# Patient Record
Sex: FEMALE | Race: WHITE | Employment: FULL TIME | URBAN - METROPOLITAN AREA
[De-identification: names, ages, dates, MRNs, and addresses within clinical notes are randomized per-mention and may not be internally consistent; named-entity substitution may affect disease eponyms.]

---

## 2019-02-05 ENCOUNTER — HOSPITAL ENCOUNTER (EMERGENCY)
Age: 34
Discharge: HOME | End: 2019-02-05
Attending: EMERGENCY MEDICINE
Payer: COMMERCIAL

## 2019-02-05 ENCOUNTER — APPOINTMENT (OUTPATIENT)
Dept: GENERAL RADIOLOGY | Age: 34
End: 2019-02-05
Payer: COMMERCIAL

## 2019-02-05 VITALS
BODY MASS INDEX: 32.44 KG/M2 | TEMPERATURE: 98.6 F | RESPIRATION RATE: 18 BRPM | DIASTOLIC BLOOD PRESSURE: 82 MMHG | SYSTOLIC BLOOD PRESSURE: 131 MMHG | HEART RATE: 90 BPM | WEIGHT: 190 LBS | OXYGEN SATURATION: 100 % | HEIGHT: 64 IN

## 2019-02-05 DIAGNOSIS — J06.9 VIRAL URI WITH COUGH: Primary | ICD-10-CM

## 2019-02-05 LAB
DIRECT EXAM: NORMAL
Lab: NORMAL
SPECIMEN DESCRIPTION: NORMAL
STATUS: NORMAL

## 2019-02-05 PROCEDURE — 71046 X-RAY EXAM CHEST 2 VIEWS: CPT

## 2019-02-05 PROCEDURE — 87804 INFLUENZA ASSAY W/OPTIC: CPT

## 2019-02-05 PROCEDURE — 99283 EMERGENCY DEPT VISIT LOW MDM: CPT

## 2019-02-05 RX ORDER — CIPROFLOXACIN 500 MG/1
500 TABLET, FILM COATED ORAL 2 TIMES DAILY
Qty: 20 TABLET | Refills: 0 | Status: SHIPPED | OUTPATIENT
Start: 2019-02-05 | End: 2019-02-15

## 2019-02-05 RX ORDER — ALBUTEROL SULFATE 90 UG/1
2 AEROSOL, METERED RESPIRATORY (INHALATION) EVERY 6 HOURS PRN
COMMUNITY

## 2019-02-05 RX ORDER — GUAIFENESIN AND CODEINE PHOSPHATE 100; 10 MG/5ML; MG/5ML
7.5 SOLUTION ORAL 3 TIMES DAILY PRN
Qty: 100 ML | Refills: 0 | Status: SHIPPED | OUTPATIENT
Start: 2019-02-05 | End: 2019-02-08

## 2019-02-05 RX ORDER — PREDNISONE 10 MG/1
40 TABLET ORAL DAILY
Qty: 20 TABLET | Refills: 0 | Status: SHIPPED | OUTPATIENT
Start: 2019-02-05 | End: 2019-02-10

## 2019-02-05 ASSESSMENT — ENCOUNTER SYMPTOMS
TROUBLE SWALLOWING: 0
EYE REDNESS: 0
ABDOMINAL PAIN: 0
VOMITING: 0
NAUSEA: 0
COUGH: 1
DIARRHEA: 0
SHORTNESS OF BREATH: 1
EYE DISCHARGE: 0

## 2021-11-04 ENCOUNTER — HOSPITAL ENCOUNTER (OUTPATIENT)
Age: 36
Setting detail: SPECIMEN
Discharge: HOME | End: 2021-11-04
Payer: COMMERCIAL

## 2021-11-06 LAB
SARS-COV-2: NORMAL
SARS-COV-2: NOT DETECTED
SOURCE: NORMAL

## 2022-10-12 ENCOUNTER — HOSPITAL ENCOUNTER (OUTPATIENT)
Age: 37
Discharge: HOME | End: 2022-10-12

## 2022-10-12 ENCOUNTER — HOSPITAL ENCOUNTER (OUTPATIENT)
Dept: GENERAL RADIOLOGY | Age: 37
Discharge: HOME | End: 2022-10-14
Payer: COMMERCIAL

## 2022-10-12 DIAGNOSIS — T14.90XA INJURY: ICD-10-CM

## 2022-10-12 PROCEDURE — 73130 X-RAY EXAM OF HAND: CPT

## 2022-10-14 ENCOUNTER — OFFICE VISIT (OUTPATIENT)
Dept: ORTHOPEDIC SURGERY | Age: 37
End: 2022-10-14
Payer: COMMERCIAL

## 2022-10-14 VITALS — BODY MASS INDEX: 32.44 KG/M2 | WEIGHT: 190 LBS | HEIGHT: 64 IN

## 2022-10-14 DIAGNOSIS — S62.514A CLOSED NONDISPLACED FRACTURE OF PROXIMAL PHALANX OF RIGHT THUMB, INITIAL ENCOUNTER: Primary | ICD-10-CM

## 2022-10-14 DIAGNOSIS — S63.91XA SPRAIN OF RIGHT HAND, INITIAL ENCOUNTER: ICD-10-CM

## 2022-10-14 DIAGNOSIS — S63.91XA SPRAIN OF UNSPECIFIED PART OF RIGHT WRIST AND HAND, INITIAL ENCOUNTER: ICD-10-CM

## 2022-10-14 PROCEDURE — 99203 OFFICE O/P NEW LOW 30 MIN: CPT | Performed by: PHYSICIAN ASSISTANT

## 2022-10-14 RX ORDER — METHYLPREDNISOLONE 4 MG/1
TABLET ORAL
Qty: 1 KIT | Refills: 0 | Status: SHIPPED | OUTPATIENT
Start: 2022-10-14 | End: 2022-10-20

## 2022-10-14 RX ORDER — MONTELUKAST SODIUM 10 MG/1
TABLET ORAL
COMMUNITY
Start: 2022-09-11

## 2022-10-14 NOTE — PROGRESS NOTES
[address]  [address] [facility] [address]  Dept: [phone]    Ambulatory Orthopedic New Patient Visit      CHIEF COMPLAINT:    Chief Complaint   Patient presents with    New Patient     [facility] DOI-10/12 RIGHT THUMB AND WRIST       HISTORY OF PRESENT ILLNESS:      Date of injury: 10/12/2022  [facility] Claim #: ?    Approved Dx:   1. Q29.333Z Non-displaced fracture proximal phalanx of right thumb   2. S63.91XA Sprain of unspecified part of right wrist and hand      Ashley Harvey is a 40 y.o. female who presents to the office today with right wrist and thumb pain after a work-related injury sustained on 10/12/2022. 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 The patient noted immediate pain within the right wrist and thumb. She was evaluated by occupational health on 10/13/2022 and was found to have a probable avulsion fracture of the distal phalanx of the right thumb. She was given a thumb spica brace and was instructed to follow-up in our office today for further evaluation and treatment. Patient notes improvement in her right hand discomfort since the initial injury but does have discomfort within the thumb and into the radial side of the right wrist.  He denies pain within the right wrist or hand prior to the injury on 10/12/2022. She denies prior surgery to the right wrist or hand prior to the injury. Past Medical History:    History reviewed. No pertinent past medical history. Past Surgical History:    History reviewed. No pertinent surgical history.     Current Medications:   Current Outpatient Medications   Medication Sig Dispense Refill    montelukast (SINGULAIR) 10 MG tablet TAKE 1 TABLET BY MOUTH IN THE EVENING      albuterol sulfate  (90 Base) MCG/ACT inhaler Inhale 2 puffs into the lungs every 6 hours as needed for Wheezing      methylPREDNISolone (MEDROL DOSEPACK) 4 MG tablet Take by mouth. 1 kit 0     No current facility-administered medications for this visit. Allergies:    Sulfa antibiotics    Social History:   Social History     Socioeconomic History    Marital status:      Spouse name: Not on file    Number of children: Not on file    Years of education: Not on file    Highest education level: Not on file   Occupational History    Not on file   Tobacco Use    Smoking status: Never    Smokeless tobacco: Never   Vaping Use    Vaping Use: Never used   Substance and Sexual Activity    Alcohol use: Yes     Comment: socailly    Drug use: No    Sexual activity: Yes     Partners: Male   Other Topics Concern    Not on file   Social History Narrative    Not on file     Social Determinants of Health     Financial Resource Strain: Not on file   Food Insecurity: Not on file   Transportation Needs: Not on file   Physical Activity: Not on file   Stress: Not on file   Social Connections: Not on file   Intimate Partner Violence: Not on file   Housing Stability: Not on file       Family History:  History reviewed. No pertinent family history. REVIEW OF SYSTEMS:  Review of Systems   Constitutional:  Negative for activity change and fever. HENT:  Negative for sneezing. Respiratory:  Negative for cough and shortness of breath. Cardiovascular:  Negative for chest pain. Gastrointestinal:  Negative for vomiting. Musculoskeletal:  Positive for arthralgias (right wrist, thumb). Negative for joint swelling and myalgias. Skin:  Negative for color change. Neurological:  Negative for weakness and numbness. Psychiatric/Behavioral:  Negative for sleep disturbance. PHYSICAL EXAM:  Ht 5' 4\" (1.626 m)   Wt 190 lb (86.2 kg)   BMI 32.61 kg/m²  Body mass index is 32.61 kg/m². Physical Exam  Gen: alert and oriented  Psych:  Appropriate affect;  Appropriate knowledge base; Appropriate mood; No hallucinations; Head: normocephalic, atraumatic   Chest: symmetric chest excursion  Pelvis: stable with ambulation  Ortho Exam  Extremity:  Patient arrived in a thumb spica brace on the right upper extremity. After removal of the brace evaluation of the right wrist and hand reveals mild diffuse swelling noted on the dorsum of the right hand and into the base of the right thumb. There is no erythema, ecchymosis, skin lesions or signs of infection noted. The patient is able to make a loose composite fist with the right hand without difficulty. She notes discomfort with flexion and extension of the right wrist primarily on the radial side. No tenderness with palpation noted over the distal radius or ulna. Tenderness noted over the IP joint of the right thumb primarily on the palmar surface. No ligamentous laxity appreciated with valgus or varus force placed on the IP joint of the right thumb. The patient is able to actively flex and extend the IP joint of the right thumb and the MCP joint of the right thumb without difficulty. Sensation is intact to light touch of the right upper extremity without focal deficits present. The skin is noted to be warm with brisk capillary fill distally on the right hand. Radiology:  XR HAND RIGHT (MIN 3 VIEWS)    Result Date: 10/12/2022  EXAM: XR Right Hand Complete, 3 or More Views EXAM DATE/TIME: 10/12/2022 3:26 pm CLINICAL HISTORY: ORDERING SYSTEM PROVIDED  Injury  TECHNOLOGIST PROVIDED HISTORY: hyperextended thumb  SYSTOC ORDER EK:->[id number] TECHNIQUE: Frontal, lateral and oblique views of the right hand. COMPARISON: No relevant prior studies available. FINDINGS: Bones/joints:  Horizontal lucency paralleling the articular surface involving the base of the proximal phalanx of the right thumb suggesting a nondisplaced fracture. No dislocation. Soft tissues:  No acute findings. No radiopaque foreign body.      Horizontal lucency paralleling the articular surface involving the base of the proximal phalanx of the right thumb suggesting a nondisplaced fracture. ASSESSMENT:     1. Closed nondisplaced fracture of proximal phalanx of right thumb, initial encounter    2. Sprain of right hand, initial encounter         PLAN:     Patient is here today for a right hand/thumb injury sustained on 10/12/2022 while at work with [employer]. Patient was seen and evaluated on 10/13/2022 by [facility] occupational health and was found to have a nondisplaced fracture of the proximal phalanx of the right thumb with a sprain of the right hand. Due to the patient's continued pain within the right hand work restrictions including:  No lifting, reaching, pushing, pulling with the right upper extremity were placed. Patient can continue to work on the computer at work but needs to [employer] in the right wrist thumb spica brace. Patient was given a prescription for a Medrol Dosepak for her inflammation and discomfort within the right hand. She was instructed to transition to Tylenol and/or ibuprofen after completion of the Medrol Dosepak. The patient may remove the brace multiple times per day to work on gentle range of motion of the right wrist and hand so that it does not get overly stiff. She is to follow-up in 10 days for reevaluation. No x-ray needed at next appointment unless the patient is more painful. Return in about 10 days (around 10/24/2022) for re-evaluation. Total Time: 35 min      No orders of the defined types were placed in this encounter. No orders of the defined types were placed in this encounter. This note is created with the assistance of a speech recognition program.  While intending to generate a document that actually reflects the content of the visit, the document can still have some errors including those of syntax and sound a like substitutions which may escape proof reading.   In such instances, actual meaning can be extrapolated by contextual diversion.      Electronically signed by Deacon Colon PA-C 10/17/2022 at 11:11 AM

## 2022-10-17 ASSESSMENT — ENCOUNTER SYMPTOMS
SHORTNESS OF BREATH: 0
COLOR CHANGE: 0
COUGH: 0
VOMITING: 0

## 2022-10-24 ENCOUNTER — OFFICE VISIT (OUTPATIENT)
Dept: ORTHOPEDIC SURGERY | Age: 37
End: 2022-10-24
Payer: COMMERCIAL

## 2022-10-24 VITALS — HEIGHT: 64 IN | WEIGHT: 190 LBS | BODY MASS INDEX: 32.44 KG/M2

## 2022-10-24 DIAGNOSIS — S63.91XD SPRAIN OF RIGHT HAND, SUBSEQUENT ENCOUNTER: Primary | ICD-10-CM

## 2022-10-24 PROCEDURE — 99214 OFFICE O/P EST MOD 30 MIN: CPT | Performed by: PHYSICIAN ASSISTANT

## 2022-10-24 ASSESSMENT — ENCOUNTER SYMPTOMS
COLOR CHANGE: 0
COUGH: 0
VOMITING: 0
SHORTNESS OF BREATH: 0

## 2022-10-24 NOTE — PROGRESS NOTES
[address]  [address] [facility] [address]  Dept: [phone]  Dept Fax: [phone]        Ambulatory Follow Up [facility]      Subjective:   Luz Marina Kidd is a 40y.o. year old female who presents to our office today for routine followup regarding her   1. Sprain of right hand, subsequent encounter        Chief Complaint   Patient presents with    Follow-up     [facility] R thumb and Wrist injury on 10/12/2022        Date of injury: 10/12/2022  [facility] Claim #: ?     Approved Dx:   1. B22.775C Non-displaced fracture proximal phalanx of right thumb   2. S63.91XA Sprain of unspecified part of right wrist and hand       HPI Luz Marina Kidd  is a 40 y.o. Right hand dominant  female who presents today in follow for right hand injury that was sustained on 10/12/2022 while working for [employer]. The patient was last seen on 10/14/2022 and underwent treatment in the form of work restrictions stating no lifting, reaching, pushing, pulling with the right upper extremity but patient may continue seated computer work, remain in thumb spica brace on the right upper extremity, prescription for a Medrol Dosepak. The patient notes some improvement with the previous treatment. She notes that yesterday she utilized her hand without the brace on for a couple hours and noticed an increase in her discomfort primarily within the thumb. Review of Systems   Constitutional:  Negative for activity change and fever. HENT:  Negative for sneezing. Respiratory:  Negative for cough and shortness of breath. Cardiovascular:  Negative for chest pain. Gastrointestinal:  Negative for vomiting. Musculoskeletal:  Positive for arthralgias (Right hand). Negative for joint swelling and myalgias. Skin:  Negative for color change. Neurological:  Negative for weakness and numbness. Psychiatric/Behavioral:  Negative for sleep disturbance. Objective :   Ht 5' 4\" (1.626 m)   Wt 190 lb (86.2 kg)   BMI 32.61 kg/m²  Body mass index is 32.61 kg/m². General: Melina Daily is a 40 y.o. female who is alert and oriented and sitting comfortably in our office. Ortho Exam  MS: Patient arrived in a thumb spica brace on the right upper extremity. After removal of the brace evaluation of the right wrist and hand reveals no obvious deformity. There is no erythema, ecchymosis, edema, skin lesions or signs of infection noted. The patient is able to make a tight composite fist with the right hand but does note discomfort in through the first metacarpal and into the radial side of the wrist.  Mild tenderness noted with palpation over the MCP joint of the right thumb. No tenderness over the IP joint. Remainder of the right hand is nontender with palpation. Sensation is intact to light touch of the right upper extremity without focal deficits present. The skin is noted to be warm with brisk capillary refill distally on the right hand. Neuro: alert and oriented to person and place. Eyes: Extra-ocular muscles intact  Mouth: Oral mucosa moist. No perioral lesions  Pulm: Respirations unlabored and regular. Symmetric chest excursion without outward deformity is noted. Skin: warm, well perfused  Psych:   Patient has good fund of knowledge and displays understanging of exam, diagnosis, and plan. Radiology:   No new imaging obtained today in office. Assessment:      1. Sprain of right hand, subsequent encounter       Plan:      The patient is here for follow-up in regards to her right hand injury sustained on 10/12/2022 while at work. At this time elected the patient to continue light duty work restrictions stating: \"No lifting, reaching, pushing, pulling with the right upper extremity. Brace as needed on the right hand. The patient may continue computer work.   Please allow patient to complete occupational hand therapy and leave work if appointment times conflict. \"    We will request a referral to outpatient occupational hand therapy to work on right hand range of motion restoration, strengthening and improvement in her functional daily tasks. I discussed with the patient that she may wear her brace as needed on the right hand, but should continue to utilize it while at work. The patient will follow up in 4 weeks for reevaluation. She was instructed to call our office with any questions or concerns. She noted her understanding. Follow up:Return in about 4 weeks (around 11/21/2022) for re-evaluation. Total Time: 30 min      No orders of the defined types were placed in this encounter. No orders of the defined types were placed in this encounter. This note is created with the assistance of a speech recognition program.  While intending to generate a document that actually reflects the content of the visit, the document can still have some errors including those of syntax and sound a like substitutions which may escape proof reading. In such instances, actual meaning can be extrapolated by contextual diversion.      Electronically signed by Aamir Chappell PA-C on 10/24/2022 at 12:54 PM

## 2022-11-28 ENCOUNTER — OFFICE VISIT (OUTPATIENT)
Dept: ORTHOPEDIC SURGERY | Age: 37
End: 2022-11-28
Payer: COMMERCIAL

## 2022-11-28 VITALS — WEIGHT: 190 LBS | HEIGHT: 64 IN | BODY MASS INDEX: 32.44 KG/M2

## 2022-11-28 DIAGNOSIS — S63.91XD SPRAIN OF RIGHT HAND, SUBSEQUENT ENCOUNTER: Primary | ICD-10-CM

## 2022-11-28 PROCEDURE — 99214 OFFICE O/P EST MOD 30 MIN: CPT | Performed by: PHYSICIAN ASSISTANT

## 2022-11-28 ASSESSMENT — ENCOUNTER SYMPTOMS
COUGH: 0
VOMITING: 0
SHORTNESS OF BREATH: 0
COLOR CHANGE: 0

## 2022-11-28 NOTE — PROGRESS NOTES
[address]  [address] [facility] [address]  Dept: [phone]  Dept Fax: [phone]        [facility] [address] [facility]      Subjective:   Italo Villatoro is a 40y.o. year old female who presents to our office today for routine followup regarding her   1. Sprain of right hand, subsequent encounter    . Chief Complaint   Patient presents with    Follow-up     [facility]   PT was not approved nor denied so patient has not started. Date of injury: 10/12/2022  [address] [place] [place] Claim #: [id number]     Approved Dx:   1. R51.105S Non-displaced fracture proximal phalanx of right thumb   2. S63.91XA Sprain of unspecified part of right wrist and hand       HPI Italo Villatoro  is a 40 y.o. Right hand dominant  female who presents today in follow for right hand injury sustained on 10/12/2022 while working with [employer]. Patient was last seen on 10/24/2022 and was instructed to continue her light duty restrictions stating \"no lifting, reaching, pushing, pulling with the right upper extremity but patient may continue seated computer work\" she notes that she has been wearing her right wrist brace intermittently but has not been relying on it full-time. Patient does note significant improvement in her right hand pain but notes limitations in her  strength since the injury. At her last appointment we did submit a C9 to request occupational hand therapy, we have not received approval or denial therefore it was recently resubmitted. Review of Systems   Constitutional:  Negative for activity change and fever. HENT:  Negative for sneezing. Respiratory:  Negative for cough and shortness of breath. Cardiovascular:  Negative for chest pain. Gastrointestinal:  Negative for vomiting. Musculoskeletal:  Negative for arthralgias, joint swelling and myalgias. Skin:  Negative for color change.    Neurological:  Negative for weakness and numbness. Psychiatric/Behavioral:  Negative for sleep disturbance. Objective :   Ht 5' 4\" (1.626 m)   Wt 190 lb (86.2 kg)   BMI 32.61 kg/m²  Body mass index is 32.61 kg/m². General: Georgia Bruner is a 40 y.o. female who is alert and oriented and sitting comfortably in our office. Ortho Exam  MS: Evaluation of the right wrist and hand reveals no obvious deformity. There is no erythema, ecchymosis, edema, skin lesions or signs of infection noted. The patient is able to make a tight composite fist with the right hand but does note mild discomfort in through the first metacarpal and into the radial side of the wrist.  Mild tenderness noted with palpation over the MCP joint of the right thumb. No tenderness over the IP joint. Remainder of the right hand is nontender with palpation. Sensation is intact to light touch of the right upper extremity without focal deficits present. The skin is noted to be warm with brisk capillary refill distally on the right hand. Radial pulse 2+ on the right. Neuro: alert and oriented to person and place. Eyes: Extra-ocular muscles intact  Mouth: Oral mucosa moist. No perioral lesions  Pulm: Respirations unlabored and regular. Symmetric chest excursion without outward deformity is noted. Skin: warm, well perfused  Psych:   Patient has good fund of knowledge and displays understanging of exam, diagnosis, and plan. Radiology:   No new imaging obtained today in office. Assessment:      1. Sprain of right hand, subsequent encounter       Plan:      Patient is here today for follow-up in regards to her right hand injury sustained at work on 10/12/2022 while working for [employer]. Patient was last evaluated on 10/24/2022 and underwent treatment in the form of submission for outpatient occupational hand therapy and to increase her right hand range of motion and  strength.   Patient notes that she did not receive any information about approval or denial for the physical therapy. She notes continued improvement in her pain but still notes discomfort within the  strength within her right hand. Our office recently resubmitted the C9 to request occupational hand therapy. I discussed with the patient that she should call her  to see if they can expedite the approval.      Patient will follow-up in 3 to 4 weeks for re-evaluation. She was instructed to continue her light duty work restrictions. Patient was instructed to only wear the brace intermittently. She noted her understanding. Follow up:Return in about 4 weeks (around 12/26/2022) for re-evaluation. Total Time: 30 min      No orders of the defined types were placed in this encounter. No orders of the defined types were placed in this encounter. This note is created with the assistance of a speech recognition program.  While intending to generate a document that actually reflects the content of the visit, the document can still have some errors including those of syntax and sound a like substitutions which may escape proof reading. In such instances, actual meaning can be extrapolated by contextual diversion.      Electronically signed by Anneliese Wong PA-C on 11/28/2022 at 10:55 AM

## 2022-12-29 ENCOUNTER — TELEPHONE (OUTPATIENT)
Dept: ORTHOPEDIC SURGERY | Age: 37
End: 2022-12-29

## 2022-12-29 NOTE — TELEPHONE ENCOUNTER
Martin Hunt from [address] [employer] called and wanted last OVN, [facility] and therapy notes faxed over.  Notes faxed over, informed her that we did not have approval for therapy, resent C9 for OT

## 2023-05-23 ENCOUNTER — HOSPITAL ENCOUNTER (EMERGENCY)
Age: 38
Discharge: HOME | End: 2023-05-23
Attending: EMERGENCY MEDICINE
Payer: COMMERCIAL

## 2023-05-23 VITALS
DIASTOLIC BLOOD PRESSURE: 76 MMHG | HEART RATE: 94 BPM | BODY MASS INDEX: 32.44 KG/M2 | SYSTOLIC BLOOD PRESSURE: 114 MMHG | WEIGHT: 190 LBS | RESPIRATION RATE: 18 BRPM | HEIGHT: 64 IN | OXYGEN SATURATION: 99 % | TEMPERATURE: 99.5 F

## 2023-05-23 DIAGNOSIS — R21 RASH AND OTHER NONSPECIFIC SKIN ERUPTION: Primary | ICD-10-CM

## 2023-05-23 PROCEDURE — 99282 EMERGENCY DEPT VISIT SF MDM: CPT

## 2023-05-23 RX ORDER — SPIRONOLACTONE 50 MG/1
TABLET, FILM COATED ORAL
COMMUNITY
Start: 2023-04-25 | End: 2023-05-23 | Stop reason: SINTOL

## 2023-05-23 RX ORDER — FAMOTIDINE 20 MG/1
20 TABLET, FILM COATED ORAL 2 TIMES DAILY
COMMUNITY

## 2023-05-23 ASSESSMENT — PAIN - FUNCTIONAL ASSESSMENT: PAIN_FUNCTIONAL_ASSESSMENT: 0-10

## 2023-05-23 ASSESSMENT — LIFESTYLE VARIABLES: HOW OFTEN DO YOU HAVE A DRINK CONTAINING ALCOHOL: 2-4 TIMES A MONTH

## 2023-05-23 NOTE — ED PROVIDER NOTES
[address] [facility] Emergency Department  [address] [facility] [address] [facility] [address]  Phone: [phone]  Fax: [phone]      Attending Physician [address]       Chief Complaint   Patient presents with    Rash       DIAGNOSTIC RESULTS     LABS:  Labs Reviewed - No data to display    All other labs were within normal range or not returned as of this dictation. RADIOLOGY:  No orders to display         EMERGENCY DEPARTMENT COURSE:   Vitals:    Vitals:    05/23/23 1659   BP: 114/76   Pulse: 94   Resp: 18   Temp: 99.5 °F (37.5 °C)   TempSrc: Oral   SpO2: 99%   Weight: 86.2 kg (190 lb)   Height: 5' 4\" (1.626 m)     -------------------------  BP: 114/76, Temp: 99.5 °F (37.5 °C), Pulse: 94, Respirations: 18             PERTINENT ATTENDING PHYSICIAN COMMENTS:    I performed a history and physical examination of the patient and discussed management with the mid level provider. I reviewed the mid level provider's note and agree with the documented findings and plan of care. Any areas of disagreement are noted on the chart. I was personally present for the key portions of any procedures. I have documented in the chart those procedures where I was not present during the key portions. I have reviewed the emergency nurses triage note. I agree with the chief complaint, past medical history, past surgical history, allergies, medications, social and family history as documented unless otherwise noted below. Documentation of the HPI, Physical Exam and Medical Decision Making performed by mid level providers is based on my personal performance of the HPI, PE and MDM. For Physician Assistant/ Nurse Practitioner cases/documentation I have personally evaluated this patient and have completed at least one if not all key elements of the E/M (history, physical exam, and MDM). Additional findings are as noted. Generalized, itchy rash. Recently started on spironolactone for PCOS.  No mucosal

## 2023-05-23 NOTE — ED TRIAGE NOTES
Pt to ED with c/o systemic upper body rash that started 5/12/23. Pt was started on Aldactone one month prior but had been using Benadryl and Singular. Pt was prescribed steroids by [facility] that she finished about 2-3 days ago with no relief. Rash is itchy and burning. Pt denies any change in soap/detergent/lotions.

## 2023-05-23 NOTE — ED PROVIDER NOTES
[address]  eMERGENCY dEPARTMENT eNCOUnter      Pt Name: Claudia Larios  MRN: [id number]  [place] 1985  Date of evaluation: 5/23/2023  Provider: Irais Borjas PA-C    CHIEF COMPLAINT       Chief Complaint   Patient presents with    Rash           HISTORY OF PRESENT ILLNESS  (Location/Symptom, Timing/Onset, Context/Setting, Quality, Duration, Modifying Factors, Severity.)   Claudia Larios is a 45 y.o. female who presents to the emergency department c/o rash to torso. States rash started 12 days ago. Patient started spironolactone 1 month prior to the rash, she saw urgent care who placed him on steroids and antihistamines which did not help. Denies any trouble breathing or swallowing. Denies any abd pain, cp, sob, n/v/d/c. Denies any fevers. Quality: itchy  Duration: constant  Modifying Factors: none  Severity: mild    Nursing Notes were reviewed. REVIEW OF SYSTEMS    (2-9 systems for level 4, 10 or more for level 5)     Review of Systems   C/o rash  Denies trouble breathing  Denies cp  Denies fevers. Except as noted above the remainder of the review of systems was reviewed and negative.        PAST MEDICAL HISTORY     Past Medical History:   Diagnosis Date    PCOS (polycystic ovarian syndrome)      None otherwise stated in nurses notes    SURGICAL HISTORY       Past Surgical History:   Procedure Laterality Date    TUBAL LIGATION  09/2022     None otherwise stated in nurses notes    CURRENT MEDICATIONS       Discharge Medication List as of 5/23/2023  5:45 PM        CONTINUE these medications which have NOT CHANGED    Details   Fexofenadine-Pseudoephedrine (ALLEGRA-D 24 HOUR PO) Take by mouthHistorical Med      HYDROXYZINE HCL PO Take by mouthHistorical Med      famotidine (PEPCID) 20 MG tablet Take 1 tablet by mouth 2 times dailyHistorical Med      montelukast (SINGULAIR) 10 MG tablet TAKE 1 TABLET BY MOUTH IN THE EVENINGHistorical Med

## 2023-05-23 NOTE — ED NOTES
Patient provided with discharge instructions, prescriptions, and follow up information. Verbalized understanding. No IV access to discontinue. A&OX3. Steady gait noted at discharge. Wheelchair declined by patient.         Joe Jay RN  05/23/23 3633

## 2023-05-25 ENCOUNTER — TELEPHONE (OUTPATIENT)
Dept: DERMATOLOGY | Age: 38
End: 2023-05-25

## 2023-05-25 NOTE — TELEPHONE ENCOUNTER
Called patient to set up new patient appointment, I offered her the office first available, she do not want to wait and is looking to be seen some time this month or next month so she refused

## 2023-05-25 NOTE — TELEPHONE ENCOUNTER
----- Message from Fredi Martinez MD sent at 5/24/2023  5:33 PM EDT -----  New patient next available  ----- Message -----  From: Randy Munguia MD  Sent: 5/23/2023   6:21 PM EDT  To: Fredi Martinez MD

## 2024-10-17 ENCOUNTER — OFFICE VISIT (OUTPATIENT)
Dept: FAMILY MEDICINE CLINIC | Age: 39
End: 2024-10-17
Payer: COMMERCIAL

## 2024-10-17 VITALS
HEART RATE: 85 BPM | DIASTOLIC BLOOD PRESSURE: 78 MMHG | WEIGHT: 200 LBS | OXYGEN SATURATION: 98 % | SYSTOLIC BLOOD PRESSURE: 117 MMHG | HEIGHT: 64 IN | BODY MASS INDEX: 34.15 KG/M2

## 2024-10-17 DIAGNOSIS — R63.5 WEIGHT GAIN: ICD-10-CM

## 2024-10-17 DIAGNOSIS — Z00.00 PREVENTATIVE HEALTH CARE: Primary | ICD-10-CM

## 2024-10-17 DIAGNOSIS — Z87.42 HISTORY OF PCOS: ICD-10-CM

## 2024-10-17 PROBLEM — J98.9 RESPIRATORY COMPLICATION: Status: ACTIVE | Noted: 2023-07-11

## 2024-10-17 PROCEDURE — 99385 PREV VISIT NEW AGE 18-39: CPT | Performed by: NURSE PRACTITIONER

## 2024-10-17 RX ORDER — FLUTICASONE PROPIONATE 44 UG/1
1 AEROSOL, METERED RESPIRATORY (INHALATION) 2 TIMES DAILY
COMMUNITY

## 2024-10-17 SDOH — ECONOMIC STABILITY: FOOD INSECURITY: WITHIN THE PAST 12 MONTHS, THE FOOD YOU BOUGHT JUST DIDN'T LAST AND YOU DIDN'T HAVE MONEY TO GET MORE.: NEVER TRUE

## 2024-10-17 SDOH — ECONOMIC STABILITY: FOOD INSECURITY: WITHIN THE PAST 12 MONTHS, YOU WORRIED THAT YOUR FOOD WOULD RUN OUT BEFORE YOU GOT MONEY TO BUY MORE.: NEVER TRUE

## 2024-10-17 SDOH — ECONOMIC STABILITY: INCOME INSECURITY: HOW HARD IS IT FOR YOU TO PAY FOR THE VERY BASICS LIKE FOOD, HOUSING, MEDICAL CARE, AND HEATING?: NOT HARD AT ALL

## 2024-10-17 ASSESSMENT — ENCOUNTER SYMPTOMS
CHEST TIGHTNESS: 0
COLOR CHANGE: 0
ABDOMINAL PAIN: 0
SORE THROAT: 0
SHORTNESS OF BREATH: 0
CONSTIPATION: 0
RHINORRHEA: 0
DIARRHEA: 0
COUGH: 0
BACK PAIN: 0
ABDOMINAL DISTENTION: 0
NAUSEA: 0

## 2024-10-17 ASSESSMENT — PATIENT HEALTH QUESTIONNAIRE - PHQ9
SUM OF ALL RESPONSES TO PHQ QUESTIONS 1-9: 0
2. FEELING DOWN, DEPRESSED OR HOPELESS: NOT AT ALL
1. LITTLE INTEREST OR PLEASURE IN DOING THINGS: NOT AT ALL
SUM OF ALL RESPONSES TO PHQ QUESTIONS 1-9: 0
SUM OF ALL RESPONSES TO PHQ9 QUESTIONS 1 & 2: 0
SUM OF ALL RESPONSES TO PHQ QUESTIONS 1-9: 0
SUM OF ALL RESPONSES TO PHQ QUESTIONS 1-9: 0

## 2024-10-17 NOTE — PROGRESS NOTES
- Lifestyle changes: Decrease fats, sugars, carbohydrates, and increase routine exercise, try to get 150 minutes of aerobic activity a week.  - BP and HR noted on today's visit  - I did d/w pt about starting Adipex and treating for 3 months if labs are stable.   - information given on AVS.      History of PCOS  - Insulin, Total; Future     Preventative health care  - We did discuss the recommended preventative screening guidelines including routine dental and eye exams.   - Detailed education was provided on the patient's after visit summary.  - Will order above noted labs and discuss them at follow up visit.  - Will cont to follow with  OB/GYN as instructed for routine PAP.  - Annual mammograms as recommended.    - pt verbalized understanding plan of care.    Medications, labs, diagnostic studies, consultations and follow-up as documented in this encounter. Rest of systems unchanged, continue current treatments  On this date 10/17/2024 I have spent 30 minutes reviewing previous notes, test results and face to face with the patient discussing the diagnosis and importance of compliance with the treatment plan as well as documenting on the day of the visit.     Sofia Kim. APRN-CNP

## 2024-10-17 NOTE — PATIENT INSTRUCTIONS
Health Maintenance Recommendations  Exercise   I generally recommend that people of all ages try to get 150 minutes of physical activity per week and it doesn’t matter how this totals up, in other words 30 minutes 5 days per week is as good as 50 minutes 3 days a week and so on.    The level of activity should be such that it is able to get your heart rate up to 100 or more, for example a brisk walk should achieve this rate.   Dietary Recommendations  In terms of diet, I generally recommend trying to eat a healthy well balanced diet full of fruits and vegetables. Avoid carbonated drinks and fruit juices and limit your alcohol use.   Avoid processed foods wherever possible (anything that comes in a can or a box) which can be achieved by sticking to the outside walls of the grocery store where generally you will find fresh fruits/vegetables, meats, dairy, and frozen foods.    Try to avoid starches in the diet where possible and minimize bread, rice, potatoes, and pasta in the diet.  Specifically try to avoid gluten, which even in people that don’t have a carmina allergy, causes havoc in the small intestine and alters absorption of nutrients which can in turn lead to obesity.   Sleep  Try to achieve a regular sleep schedule, waking and laying down at the same time each night.  Most people need 7 hours per night plus or minus 2 hours.    You will know that you’re getting enough because you will wake feeling refreshed and not need to sleep in to catch up on weekends.   Skin Care  Make sure that you don’t neglect your skin.    Play it safe in the sun. Use a sunblock on all of your exposed skin.   The sunblock should be broad spectrum and water resistant.    I do recommend an SPF 30 or higher sun screen any time that you plan to be in the sun for more than 20 minutes, even in the winter or on cloudy days (keep in mind that UV light penetrates clouds and can cause burns even on cloudy days).   Apply 20 to 30 minutes before

## 2024-10-22 ENCOUNTER — HOSPITAL ENCOUNTER (OUTPATIENT)
Age: 39
Setting detail: SPECIMEN
Discharge: HOME | End: 2024-10-22

## 2024-10-22 DIAGNOSIS — Z00.00 PREVENTATIVE HEALTH CARE: ICD-10-CM

## 2024-10-22 DIAGNOSIS — Z87.42 HISTORY OF PCOS: ICD-10-CM

## 2024-10-22 LAB
ALBUMIN SERPL-MCNC: 4.3 G/DL (ref 3.5–5.2)
ALBUMIN/GLOB SERPL: 1 {RATIO} (ref 1–2.5)
ALP SERPL-CCNC: 63 U/L (ref 35–104)
ALT SERPL-CCNC: 16 U/L (ref 10–35)
ANION GAP SERPL CALCULATED.3IONS-SCNC: 11 MMOL/L (ref 9–16)
AST SERPL-CCNC: 18 U/L (ref 10–35)
BILIRUB SERPL-MCNC: 0.3 MG/DL (ref 0–1.2)
BUN SERPL-MCNC: 8 MG/DL (ref 6–20)
CALCIUM SERPL-MCNC: 8.9 MG/DL (ref 8.6–10.4)
CHLORIDE SERPL-SCNC: 102 MMOL/L (ref 98–107)
CHOLEST SERPL-MCNC: 191 MG/DL (ref 0–199)
CHOLESTEROL/HDL RATIO: 3
CO2 SERPL-SCNC: 25 MMOL/L (ref 20–31)
CREAT SERPL-MCNC: 0.7 MG/DL (ref 0.5–0.9)
ERYTHROCYTE [DISTWIDTH] IN BLOOD BY AUTOMATED COUNT: 12.5 % (ref 11.8–14.4)
EST. AVERAGE GLUCOSE BLD GHB EST-MCNC: 100 MG/DL
GFR, ESTIMATED: >90 ML/MIN/1.73M2
GLUCOSE SERPL-MCNC: 77 MG/DL (ref 74–99)
HBA1C MFR BLD: 5.1 % (ref 4–6)
HCT VFR BLD AUTO: 38.6 % (ref 36.3–47.1)
HDLC SERPL-MCNC: 60 MG/DL
HGB BLD-MCNC: 12.8 G/DL (ref 11.9–15.1)
INSULIN REFERENCE RANGE:: NORMAL
INSULIN: 20 MU/L
LDLC SERPL CALC-MCNC: 106 MG/DL (ref 0–100)
MCH RBC QN AUTO: 32.2 PG (ref 25.2–33.5)
MCHC RBC AUTO-ENTMCNC: 33.2 G/DL (ref 28.4–34.8)
MCV RBC AUTO: 97 FL (ref 82.6–102.9)
NRBC BLD-RTO: 0 PER 100 WBC
PLATELET # BLD AUTO: 281 K/UL (ref 138–453)
PMV BLD AUTO: 10.7 FL (ref 8.1–13.5)
POTASSIUM SERPL-SCNC: 3.9 MMOL/L (ref 3.7–5.3)
PROT SERPL-MCNC: 7.2 G/DL (ref 6.6–8.7)
RBC # BLD AUTO: 3.98 M/UL (ref 3.95–5.11)
SODIUM SERPL-SCNC: 138 MMOL/L (ref 136–145)
TRIGL SERPL-MCNC: 127 MG/DL
TSH SERPL DL<=0.05 MIU/L-ACNC: 1.14 UIU/ML (ref 0.27–4.2)
VLDLC SERPL CALC-MCNC: 25 MG/DL
WBC OTHER # BLD: 8.5 K/UL (ref 3.5–11.3)

## 2024-10-23 ENCOUNTER — TELEPHONE (OUTPATIENT)
Dept: FAMILY MEDICINE CLINIC | Age: 39
End: 2024-10-23

## 2024-10-23 NOTE — TELEPHONE ENCOUNTER
Sarah Granados was contacted by Martin Souza MA, a Community Health Navigator, regarding a Social Determinants of Health referral.     Pt states they do not require assistance from [facility] at this time.    Will close referral.

## 2024-10-23 NOTE — TELEPHONE ENCOUNTER
Please let pt know I didn't order that not sure how that was done, looks like it is from a different provider.

## 2024-12-04 ENCOUNTER — OFFICE VISIT (OUTPATIENT)
Dept: FAMILY MEDICINE CLINIC | Age: 39
End: 2024-12-04
Payer: COMMERCIAL

## 2024-12-04 VITALS
OXYGEN SATURATION: 97 % | WEIGHT: 226 LBS | SYSTOLIC BLOOD PRESSURE: 128 MMHG | RESPIRATION RATE: 16 BRPM | DIASTOLIC BLOOD PRESSURE: 82 MMHG | HEART RATE: 88 BPM | BODY MASS INDEX: 38.79 KG/M2 | TEMPERATURE: 98 F

## 2024-12-04 DIAGNOSIS — Z87.42 HISTORY OF PCOS: ICD-10-CM

## 2024-12-04 DIAGNOSIS — R63.5 WEIGHT GAIN: Primary | ICD-10-CM

## 2024-12-04 PROBLEM — J98.9 RESPIRATORY COMPLICATION: Status: RESOLVED | Noted: 2023-07-11 | Resolved: 2024-12-04

## 2024-12-04 PROCEDURE — 99213 OFFICE O/P EST LOW 20 MIN: CPT | Performed by: NURSE PRACTITIONER

## 2024-12-04 RX ORDER — PHENTERMINE HYDROCHLORIDE 37.5 MG/1
TABLET ORAL
Qty: 30 TABLET | Refills: 0 | Status: SHIPPED | OUTPATIENT
Start: 2024-12-04 | End: 2025-01-01

## 2024-12-04 RX ORDER — METHYLPHENIDATE HYDROCHLORIDE 18 MG/1
18 TABLET, EXTENDED RELEASE ORAL EVERY MORNING
COMMUNITY
Start: 2024-11-17

## 2024-12-04 ASSESSMENT — ENCOUNTER SYMPTOMS
SORE THROAT: 0
RHINORRHEA: 0
NAUSEA: 0
ABDOMINAL PAIN: 0
COUGH: 0
CHEST TIGHTNESS: 0
DIARRHEA: 0
COLOR CHANGE: 0
SHORTNESS OF BREATH: 0
ABDOMINAL DISTENTION: 0
BACK PAIN: 0
CONSTIPATION: 0

## 2024-12-04 NOTE — PROGRESS NOTES
Sofia Kim, TANIA-CNP  [facility]  [address], [address]  [facility] [address]  Dept: [phone]  Dept Fax: [phone]     Patient ID: Sarah Granados is a 39 y.o. female.    HPI    Pt here today for f/u on chronic medical problems, go over labs and/or diagnostic studies, and medication refills. Pt denies any fever or chills.  Pt today denies any HA, chest pain, or SOB.  Pt denies any N/V/D/C or abdominal pain.    She is interested in losing weight.     Otherwise pt doing well on current tx and no other concerns today.     Previous office notes, labs, imaging and hospital records were reviewed prior to and during encounter.    The patient's past medical, surgical, social, and family history as well as her current medications and allergies were reviewed as documented in today's encounter by JAYLA Ring.      Current Outpatient Medications on File Prior to Visit   Medication Sig Dispense Refill    FLUoxetine (PROZAC) 20 MG capsule TAKE 1 CAPSULE BY MOUTH AS DIRECTED IN THE MORNING      fluticasone (FLOVENT HFA) 44 MCG/ACT inhaler Inhale 1 puff into the lungs 2 times daily      Fexofenadine-Pseudoephedrine (ALLEGRA-D 24 HOUR PO) Take by mouth      HYDROXYZINE HCL PO Take by mouth      famotidine (PEPCID) 20 MG tablet Take 1 tablet by mouth 2 times daily      montelukast (SINGULAIR) 10 MG tablet TAKE 1 TABLET BY MOUTH IN THE EVENING      albuterol sulfate  (90 Base) MCG/ACT inhaler Inhale 2 puffs into the lungs every 6 hours as needed for Wheezing       No current facility-administered medications on file prior to visit.       Subjective:     Review of Systems   Constitutional:  Negative for activity change, fatigue and fever.   HENT:  Negative for congestion, ear pain, rhinorrhea and sore throat.    Respiratory:  Negative for cough, chest tightness and shortness of breath.    Cardiovascular:  Negative for chest pain and palpitations.

## 2025-01-02 PROBLEM — K35.80 ACUTE APPENDICITIS: Status: ACTIVE | Noted: 2024-10-25

## 2025-01-02 ASSESSMENT — ENCOUNTER SYMPTOMS
NAUSEA: 0
COUGH: 0
SORE THROAT: 0
CHEST TIGHTNESS: 0
SHORTNESS OF BREATH: 0
ABDOMINAL DISTENTION: 0
CONSTIPATION: 0
RHINORRHEA: 0
DIARRHEA: 0
BACK PAIN: 0
ABDOMINAL PAIN: 0

## 2025-01-02 NOTE — PROGRESS NOTES
Sofia Kim, TANIA-CNP  [facility]  [address], [address]  [facility] [address]  Dept: [phone]  Dept Fax: [phone]     Patient ID: Sarah Granados is a 39 y.o. female.    HPI    Pt here today for BP and weight check while being on Adipex.  A refill of the medication is needed today. Pt denies any fever or chills.  Pt today denies any HA, chest pain, or SOB.  Pt denies any N/V/D/C or abdominal pain. This is month 1 since starting. Since her last visit, she has lost 11 lbs for a cumulative total of 11 lbs.    - no side effects     Otherwise patient is doing well on current tx and voices no other concerns today.     The patient's past medical, surgical, social, and family history as well as his current medications and allergies were reviewed as documented in today's encounter by JAYLA Ring.    My previous office notes, labs and diagnostic studies were reviewed prior to and during encounter.    Current Outpatient Medications on File Prior to Visit   Medication Sig Dispense Refill    CONCERTA 18 MG extended release tablet Take 1 tablet by mouth every morning. Max Daily Amount: 18 mg      FLUoxetine (PROZAC) 20 MG capsule TAKE 1 CAPSULE BY MOUTH AS DIRECTED IN THE MORNING      fluticasone (FLOVENT HFA) 44 MCG/ACT inhaler Inhale 1 puff into the lungs 2 times daily      famotidine (PEPCID) 20 MG tablet Take 1 tablet by mouth 2 times daily      montelukast (SINGULAIR) 10 MG tablet TAKE 1 TABLET BY MOUTH IN THE EVENING      albuterol sulfate  (90 Base) MCG/ACT inhaler Inhale 2 puffs into the lungs every 6 hours as needed for Wheezing       No current facility-administered medications on file prior to visit.        Subjective:     Review of Systems   Constitutional:  Negative for activity change, fatigue and fever.   HENT:  Negative for congestion, ear pain, rhinorrhea and sore throat.    Respiratory:  Negative for cough, chest tightness and shortness

## 2025-01-03 ENCOUNTER — OFFICE VISIT (OUTPATIENT)
Dept: FAMILY MEDICINE CLINIC | Age: 40
End: 2025-01-03
Payer: COMMERCIAL

## 2025-01-03 VITALS
SYSTOLIC BLOOD PRESSURE: 134 MMHG | RESPIRATION RATE: 16 BRPM | WEIGHT: 215 LBS | TEMPERATURE: 97.6 F | BODY MASS INDEX: 36.9 KG/M2 | HEART RATE: 96 BPM | DIASTOLIC BLOOD PRESSURE: 86 MMHG | OXYGEN SATURATION: 98 %

## 2025-01-03 DIAGNOSIS — R63.5 WEIGHT GAIN: Primary | ICD-10-CM

## 2025-01-03 DIAGNOSIS — L30.9 DERMATITIS: ICD-10-CM

## 2025-01-03 PROCEDURE — 99213 OFFICE O/P EST LOW 20 MIN: CPT | Performed by: NURSE PRACTITIONER

## 2025-01-03 RX ORDER — PHENTERMINE HYDROCHLORIDE 37.5 MG/1
37.5 TABLET ORAL
Qty: 30 TABLET | Refills: 0 | Status: SHIPPED | OUTPATIENT
Start: 2025-01-03 | End: 2025-02-02

## 2025-01-03 ASSESSMENT — PATIENT HEALTH QUESTIONNAIRE - PHQ9
SUM OF ALL RESPONSES TO PHQ QUESTIONS 1-9: 0
SUM OF ALL RESPONSES TO PHQ9 QUESTIONS 1 & 2: 0
1. LITTLE INTEREST OR PLEASURE IN DOING THINGS: NOT AT ALL
2. FEELING DOWN, DEPRESSED OR HOPELESS: NOT AT ALL

## 2025-01-03 ASSESSMENT — ENCOUNTER SYMPTOMS: COLOR CHANGE: 1

## 2025-01-28 SDOH — ECONOMIC STABILITY: FOOD INSECURITY: WITHIN THE PAST 12 MONTHS, YOU WORRIED THAT YOUR FOOD WOULD RUN OUT BEFORE YOU GOT MONEY TO BUY MORE.: NEVER TRUE

## 2025-01-28 SDOH — ECONOMIC STABILITY: TRANSPORTATION INSECURITY
IN THE PAST 12 MONTHS, HAS THE LACK OF TRANSPORTATION KEPT YOU FROM MEDICAL APPOINTMENTS OR FROM GETTING MEDICATIONS?: NO

## 2025-01-28 SDOH — ECONOMIC STABILITY: INCOME INSECURITY: IN THE LAST 12 MONTHS, WAS THERE A TIME WHEN YOU WERE NOT ABLE TO PAY THE MORTGAGE OR RENT ON TIME?: NO

## 2025-01-28 SDOH — ECONOMIC STABILITY: FOOD INSECURITY: WITHIN THE PAST 12 MONTHS, THE FOOD YOU BOUGHT JUST DIDN'T LAST AND YOU DIDN'T HAVE MONEY TO GET MORE.: NEVER TRUE

## 2025-01-28 SDOH — ECONOMIC STABILITY: TRANSPORTATION INSECURITY
IN THE PAST 12 MONTHS, HAS LACK OF TRANSPORTATION KEPT YOU FROM MEETINGS, WORK, OR FROM GETTING THINGS NEEDED FOR DAILY LIVING?: NO

## 2025-01-30 ASSESSMENT — ENCOUNTER SYMPTOMS
CONSTIPATION: 0
DIARRHEA: 0
BACK PAIN: 0
COUGH: 0
COLOR CHANGE: 1
CHEST TIGHTNESS: 0
SHORTNESS OF BREATH: 0
ABDOMINAL DISTENTION: 0
SORE THROAT: 0
RHINORRHEA: 0
NAUSEA: 0
ABDOMINAL PAIN: 0

## 2025-01-30 NOTE — PROGRESS NOTES
Sofia Kim, TAINA-CNP  [facility]  [address], [address]  [facility] [address]  Dept: [phone]  Dept Fax: [phone]     Patient ID: Sarah Granados is a 39 y.o. female.    HPI    Pt here today for BP and weight check while being on Adipex.  A refill of the medication is needed today. Pt denies any fever or chills.  Pt today denies any HA, chest pain, or SOB.  Pt denies any N/V/D/C or abdominal pain. This is month 2 since starting. Since her last visit, she has lost 5 lbs for a cumulative total of 16 lbs.    - no side effects     Otherwise patient is doing well on current tx and voices no other concerns today.     The patient's past medical, surgical, social, and family history as well as his current medications and allergies were reviewed as documented in today's encounter by JAYLA Ring.    My previous office notes, labs and diagnostic studies were reviewed prior to and during encounter.    Current Outpatient Medications on File Prior to Visit   Medication Sig Dispense Refill    phentermine (ADIPEX-P) 37.5 MG tablet Take 1 tablet by mouth every morning (before breakfast) for 30 days. Max Daily Amount: 37.5 mg 30 tablet 0    CONCERTA 18 MG extended release tablet Take 1 tablet by mouth every morning.      FLUoxetine (PROZAC) 20 MG capsule TAKE 1 CAPSULE BY MOUTH AS DIRECTED IN THE MORNING      fluticasone (FLOVENT HFA) 44 MCG/ACT inhaler Inhale 1 puff into the lungs 2 times daily      famotidine (PEPCID) 20 MG tablet Take 1 tablet by mouth 2 times daily      montelukast (SINGULAIR) 10 MG tablet TAKE 1 TABLET BY MOUTH IN THE EVENING      albuterol sulfate  (90 Base) MCG/ACT inhaler Inhale 2 puffs into the lungs every 6 hours as needed for Wheezing       No current facility-administered medications on file prior to visit.        Subjective:     Review of Systems   Constitutional:  Negative for activity change, fatigue and fever.   HENT:  Negative

## 2025-01-31 ENCOUNTER — OFFICE VISIT (OUTPATIENT)
Dept: FAMILY MEDICINE CLINIC | Age: 40
End: 2025-01-31
Payer: COMMERCIAL

## 2025-01-31 VITALS
HEART RATE: 77 BPM | SYSTOLIC BLOOD PRESSURE: 128 MMHG | WEIGHT: 210 LBS | BODY MASS INDEX: 36.05 KG/M2 | RESPIRATION RATE: 16 BRPM | TEMPERATURE: 98.4 F | OXYGEN SATURATION: 95 % | DIASTOLIC BLOOD PRESSURE: 80 MMHG

## 2025-01-31 DIAGNOSIS — R63.5 WEIGHT GAIN: Primary | ICD-10-CM

## 2025-01-31 PROCEDURE — 99213 OFFICE O/P EST LOW 20 MIN: CPT | Performed by: NURSE PRACTITIONER

## 2025-01-31 RX ORDER — PHENTERMINE HYDROCHLORIDE 37.5 MG/1
37.5 TABLET ORAL
Qty: 30 TABLET | Refills: 0 | Status: SHIPPED | OUTPATIENT
Start: 2025-01-31 | End: 2025-03-02

## 2025-02-24 ENCOUNTER — OFFICE VISIT (OUTPATIENT)
Dept: FAMILY MEDICINE CLINIC | Age: 40
End: 2025-02-24
Payer: COMMERCIAL

## 2025-02-24 VITALS
BODY MASS INDEX: 35.19 KG/M2 | WEIGHT: 205 LBS | TEMPERATURE: 98.6 F | OXYGEN SATURATION: 98 % | HEART RATE: 90 BPM | RESPIRATION RATE: 16 BRPM | SYSTOLIC BLOOD PRESSURE: 122 MMHG | DIASTOLIC BLOOD PRESSURE: 76 MMHG

## 2025-02-24 DIAGNOSIS — R63.5 WEIGHT GAIN: Primary | ICD-10-CM

## 2025-02-24 PROCEDURE — 99213 OFFICE O/P EST LOW 20 MIN: CPT | Performed by: NURSE PRACTITIONER

## 2025-02-24 RX ORDER — PHENTERMINE HYDROCHLORIDE 37.5 MG/1
37.5 TABLET ORAL
Qty: 30 TABLET | Refills: 2 | Status: SHIPPED | OUTPATIENT
Start: 2025-03-01 | End: 2025-05-30

## 2025-02-24 RX ORDER — METHYLPHENIDATE HYDROCHLORIDE 27 MG/1
27 TABLET ORAL EVERY MORNING
COMMUNITY
Start: 2025-02-12

## 2025-02-24 ASSESSMENT — ENCOUNTER SYMPTOMS
SHORTNESS OF BREATH: 0
RHINORRHEA: 0
DIARRHEA: 0
BACK PAIN: 0
ABDOMINAL PAIN: 0
CHEST TIGHTNESS: 0
SORE THROAT: 0
ABDOMINAL DISTENTION: 0
CONSTIPATION: 0
NAUSEA: 0
COLOR CHANGE: 1
COUGH: 0

## 2025-02-24 NOTE — PROGRESS NOTES
Sofia Kim, TANIA-CNP  [facility]  [address], [address]  [facility] [address]  Dept: [phone]  Dept Fax: [phone]     Patient ID: Sarah Granados is a 39 y.o. female.    HPI    Pt here today for BP and weight check while being on Adipex.  A refill of the medication is needed today. Pt denies any fever or chills.  Pt today denies any HA, chest pain, or SOB.  Pt denies any N/V/D/C or abdominal pain. This is month 3 since starting. Since her last visit, she has lost 5 lbs for a cumulative total of 21 lbs.    - no side effects     Otherwise patient is doing well on current tx and voices no other concerns today.     The patient's past medical, surgical, social, and family history as well as his current medications and allergies were reviewed as documented in today's encounter by JAYLA Ring.    My previous office notes, labs and diagnostic studies were reviewed prior to and during encounter.    Current Outpatient Medications on File Prior to Visit   Medication Sig Dispense Refill    phentermine (ADIPEX-P) 37.5 MG tablet Take 1 tablet by mouth every morning (before breakfast) for 30 days. Max Daily Amount: 37.5 mg 30 tablet 0    CONCERTA 18 MG extended release tablet Take 1 tablet by mouth every morning.      FLUoxetine (PROZAC) 20 MG capsule TAKE 1 CAPSULE BY MOUTH AS DIRECTED IN THE MORNING      fluticasone (FLOVENT HFA) 44 MCG/ACT inhaler Inhale 1 puff into the lungs 2 times daily      famotidine (PEPCID) 20 MG tablet Take 1 tablet by mouth 2 times daily      montelukast (SINGULAIR) 10 MG tablet TAKE 1 TABLET BY MOUTH IN THE EVENING      albuterol sulfate  (90 Base) MCG/ACT inhaler Inhale 2 puffs into the lungs every 6 hours as needed for Wheezing       No current facility-administered medications on file prior to visit.        Subjective:     Review of Systems   Constitutional:  Negative for activity change, fatigue and fever.   HENT:  Negative

## 2025-03-03 ENCOUNTER — TELEPHONE (OUTPATIENT)
Dept: FAMILY MEDICINE CLINIC | Age: 40
End: 2025-03-03

## 2025-03-03 ENCOUNTER — TELEMEDICINE (OUTPATIENT)
Dept: FAMILY MEDICINE CLINIC | Age: 40
End: 2025-03-03
Payer: COMMERCIAL

## 2025-03-03 DIAGNOSIS — B96.89 ACUTE BACTERIAL SINUSITIS: Primary | ICD-10-CM

## 2025-03-03 DIAGNOSIS — J01.90 ACUTE BACTERIAL SINUSITIS: Primary | ICD-10-CM

## 2025-03-03 PROCEDURE — 99213 OFFICE O/P EST LOW 20 MIN: CPT | Performed by: NURSE PRACTITIONER

## 2025-03-03 ASSESSMENT — ENCOUNTER SYMPTOMS
VOMITING: 0
SINUS PRESSURE: 1
COUGH: 1
ABDOMINAL DISTENTION: 0
RHINORRHEA: 0
BACK PAIN: 0
CONSTIPATION: 0
DIARRHEA: 0
SORE THROAT: 1
NAUSEA: 0
SHORTNESS OF BREATH: 0
CHEST TIGHTNESS: 0
SINUS PAIN: 1
ABDOMINAL PAIN: 0

## 2025-03-03 NOTE — TELEPHONE ENCOUNTER
Patient called in stating she hasn't been feeling well since Sunday 03/02/2025.  Patient states she woke up sweating, face was red, lightheadedness, and heart racing.  Patient has also been taking Tylenol and DayQuil to help with symptoms as well.    Symptoms:    Cough  Nasal drainage  Congestion  Sore Throat  Headaches

## 2025-03-03 NOTE — TELEPHONE ENCOUNTER
Pt had a VV appt this morning and is requesting a work note to be excused from work tomorrow. She would like this note emailed to her at [email or web address].

## 2025-03-03 NOTE — PROGRESS NOTES
Sofia Robles, APRN-CNP  [facility]  [facility]  [address], [address]  [facility] [address]  Dept: [phone]  Dept Fax: [phone]     PATIENT ID: Sarah Granados is a 39 y.o. female.    HPI:  Established patient presenting via virtual visit today for an acute visit secondary to cough, congestion, headache, sore throat, bilateral ear pain and pressure and sinus pain and pressure.  Pt denies any fever or chills.  Pt today denies any HA, chest pain, or SOB.  Pt denies any N/V/D/C or abdominal pain. Otherwise pt doing well on current tx and voices no other concerns.     My previous office notes, labs and diagnostic studies were reviewed prior to and during encounter.  The patient's past medical, surgical, social, and family history as well as her current medications and allergies were reviewed as documented in today's encounter by JAYLA Welsh.    Current Outpatient Medications on File Prior to Visit   Medication Sig Dispense Refill    methylphenidate (CONCERTA) 27 MG extended release tablet Take 1 tablet by mouth every morning.      phentermine (ADIPEX-P) 37.5 MG tablet Take 1 tablet by mouth every morning (before breakfast) for 90 days. Max Daily Amount: 37.5 mg 30 tablet 2    FLUoxetine (PROZAC) 20 MG capsule TAKE 1 CAPSULE BY MOUTH AS DIRECTED IN THE MORNING      fluticasone (FLOVENT HFA) 44 MCG/ACT inhaler Inhale 1 puff into the lungs 2 times daily      famotidine (PEPCID) 20 MG tablet Take 1 tablet by mouth 2 times daily      montelukast (SINGULAIR) 10 MG tablet TAKE 1 TABLET BY MOUTH IN THE EVENING      albuterol sulfate  (90 Base) MCG/ACT inhaler Inhale 2 puffs into the lungs every 6 hours as needed for Wheezing       No current facility-administered medications on file prior to visit.     SUBJECTIVE:     Review of Systems   Constitutional:  Negative for activity change, fatigue and fever.   HENT:  Positive for congestion, sinus

## 2025-06-02 ENCOUNTER — OFFICE VISIT (OUTPATIENT)
Dept: FAMILY MEDICINE CLINIC | Age: 40
End: 2025-06-02
Payer: COMMERCIAL

## 2025-06-02 VITALS
DIASTOLIC BLOOD PRESSURE: 74 MMHG | TEMPERATURE: 97 F | SYSTOLIC BLOOD PRESSURE: 124 MMHG | BODY MASS INDEX: 31.76 KG/M2 | HEART RATE: 83 BPM | OXYGEN SATURATION: 98 % | WEIGHT: 185 LBS | RESPIRATION RATE: 16 BRPM

## 2025-06-02 DIAGNOSIS — R73.09 ELEVATED GLUCOSE: ICD-10-CM

## 2025-06-02 DIAGNOSIS — Z12.31 SCREENING MAMMOGRAM FOR BREAST CANCER: ICD-10-CM

## 2025-06-02 DIAGNOSIS — Z00.00 PREVENTATIVE HEALTH CARE: ICD-10-CM

## 2025-06-02 DIAGNOSIS — Z87.42 HISTORY OF PCOS: ICD-10-CM

## 2025-06-02 DIAGNOSIS — R63.5 WEIGHT GAIN: Primary | ICD-10-CM

## 2025-06-02 DIAGNOSIS — Z12.4 CERVICAL CANCER SCREENING: ICD-10-CM

## 2025-06-02 DIAGNOSIS — Z13.220 SCREENING CHOLESTEROL LEVEL: ICD-10-CM

## 2025-06-02 PROCEDURE — 99214 OFFICE O/P EST MOD 30 MIN: CPT | Performed by: NURSE PRACTITIONER

## 2025-06-02 RX ORDER — PHENTERMINE HYDROCHLORIDE 37.5 MG/1
37.5 TABLET ORAL
Qty: 30 TABLET | Refills: 2 | Status: SHIPPED | OUTPATIENT
Start: 2025-06-02 | End: 2025-08-31

## 2025-06-02 ASSESSMENT — ENCOUNTER SYMPTOMS
SORE THROAT: 0
CONSTIPATION: 0
BACK PAIN: 0
COLOR CHANGE: 1
ABDOMINAL PAIN: 0
ABDOMINAL DISTENTION: 0
NAUSEA: 0
SHORTNESS OF BREATH: 0
RHINORRHEA: 0
COUGH: 0
DIARRHEA: 0
CHEST TIGHTNESS: 0

## 2025-06-02 NOTE — PROGRESS NOTES
Sofia Kim, TANIA-CNP  [facility]  [address], [address]  [facility] [address]  Dept: [phone]  Dept Fax: [phone]     Patient ID: Sarah Granados is a 40 y.o. female.    HPI    Pt here today for BP and weight check while being on Adipex.  A refill of the medication is needed today. Pt denies any fever or chills.  Pt today denies any HA, chest pain, or SOB.  Pt denies any N/V/D/C or abdominal pain. This is month 6 since starting. Since her last visit, she has lost 20 lbs for a cumulative total of 41 lbs.    - no side effects     Otherwise patient is doing well on current tx and voices no other concerns today.     The patient's past medical, surgical, social, and family history as well as his current medications and allergies were reviewed as documented in today's encounter by JAYLA Ring.    My previous office notes, labs and diagnostic studies were reviewed prior to and during encounter.    Current Outpatient Medications on File Prior to Visit   Medication Sig Dispense Refill    methylphenidate (CONCERTA) 27 MG extended release tablet Take 1 tablet by mouth every morning.      phentermine (ADIPEX-P) 37.5 MG tablet Take 1 tablet by mouth every morning (before breakfast) for 90 days. Max Daily Amount: 37.5 mg 30 tablet 2    FLUoxetine (PROZAC) 20 MG capsule TAKE 1 CAPSULE BY MOUTH AS DIRECTED IN THE MORNING      fluticasone (FLOVENT HFA) 44 MCG/ACT inhaler Inhale 1 puff into the lungs 2 times daily      famotidine (PEPCID) 20 MG tablet Take 1 tablet by mouth 2 times daily      montelukast (SINGULAIR) 10 MG tablet TAKE 1 TABLET BY MOUTH IN THE EVENING      albuterol sulfate  (90 Base) MCG/ACT inhaler Inhale 2 puffs into the lungs every 6 hours as needed for Wheezing       No current facility-administered medications on file prior to visit.        Subjective:     Review of Systems   Constitutional:  Negative for activity change, fatigue and

## 2025-06-02 NOTE — PATIENT INSTRUCTIONS
- Get labs done a few days before next appointment. You will want to be fasting which means nothing to eat or drink for 12 hours before completing labs.     Central scheduling number: [phone] - call to schedule mammogram      English

## 2025-06-08 ENCOUNTER — APPOINTMENT (OUTPATIENT)
Dept: GENERAL RADIOLOGY | Age: 40
End: 2025-06-08
Payer: COMMERCIAL

## 2025-06-08 ENCOUNTER — HOSPITAL ENCOUNTER (EMERGENCY)
Age: 40
Discharge: HOME | End: 2025-06-08
Attending: EMERGENCY MEDICINE
Payer: COMMERCIAL

## 2025-06-08 VITALS
DIASTOLIC BLOOD PRESSURE: 86 MMHG | WEIGHT: 192.68 LBS | HEART RATE: 80 BPM | TEMPERATURE: 97.9 F | BODY MASS INDEX: 33.07 KG/M2 | OXYGEN SATURATION: 100 % | SYSTOLIC BLOOD PRESSURE: 156 MMHG | RESPIRATION RATE: 18 BRPM

## 2025-06-08 DIAGNOSIS — S54.01XA CONTUSION OF RIGHT ULNAR NERVE, INITIAL ENCOUNTER: Primary | ICD-10-CM

## 2025-06-08 PROCEDURE — 73080 X-RAY EXAM OF ELBOW: CPT

## 2025-06-08 PROCEDURE — 6370000000 HC RX 637 (ALT 250 FOR IP)

## 2025-06-08 PROCEDURE — 99283 EMERGENCY DEPT VISIT LOW MDM: CPT | Performed by: EMERGENCY MEDICINE

## 2025-06-08 RX ORDER — ACETAMINOPHEN 500 MG
1000 TABLET ORAL
Status: COMPLETED | OUTPATIENT
Start: 2025-06-08 | End: 2025-06-08

## 2025-06-08 RX ADMIN — ACETAMINOPHEN 1000 MG: 500 TABLET ORAL at 09:14

## 2025-06-08 ASSESSMENT — LIFESTYLE VARIABLES
HOW MANY STANDARD DRINKS CONTAINING ALCOHOL DO YOU HAVE ON A TYPICAL DAY: PATIENT DOES NOT DRINK
HOW OFTEN DO YOU HAVE A DRINK CONTAINING ALCOHOL: NEVER

## 2025-06-08 NOTE — ED PROVIDER NOTES
[facility] EMERGENCY DEPARTMENT     Emergency Department     Faculty Attestation        I performed a history and physical examination of the patient and discussed management with the resident. I reviewed the resident’s note and agree with the documented findings and plan of care. Any areas of disagreement are noted on the chart. I was personally present for the key portions of any procedures. I have documented in the chart those procedures where I was not present during the key portions. I have reviewed the emergency nurses triage note. I agree with the chief complaint, past medical history, past surgical history, allergies, medications, social and family history as documented unless otherwise noted below.  For Physician Assistant/ Nurse Practitioner cases/documentation I have personally evaluated this patient and have completed at least one if not all key elements of the E/M (history, physical exam, and MDM). Additional findings are as noted.      Vital Signs: BP: (!) 156/86  Pulse: 80  Respirations: 18  Temp: 97.9 °F (36.6 °C) SpO2: 100 %  PCP:  Sofia Kim APRN - CNP  Note Started: 6/8/25, 9:16 AM EDT    Pertinent Comments:         Critical Care  None      (Please note that portions of this note were completed with a voice recognition program. Efforts were made to edit the dictations but occasionally words are mis-transcribed. Whenever words are used in this note in any gender, they shall be construed as though they were used in the gender appropriate to the circumstances; and whenever words are used in this note in the singular or plural form, they shall be construed as though they were used in the form appropriate to the circumstances.)    Sebastian Laird MD [facility]  Attending Emergency Medicine Physician           Sebastian Laird MD  06/08/25 1288    
06/08/25 1040   Sun Jun 08, 2025   1021 XR ELBOW RIGHT (MIN 3 VIEWS)  IMPRESSION:  No acute osseous abnormality. [KM]      ED Course User Index  [KM] Joshua Fish MD       PROCEDURES:  none    CONSULTS:  None    CRITICAL CARE:  There was significant risk of life threatening deterioration of patient's condition requiring my direct management. Critical care time 0 minutes, excluding any documented procedures.    FINAL IMPRESSION      1. Contusion of right ulnar nerve, initial encounter          DISPOSITION / PLAN     DISPOSITION Decision To Discharge 06/08/2025 10:38:37 AM   DISPOSITION CONDITION Stable           PATIENT REFERRED TO:  Sofia Kim, APRN - CNP  [address]  [facility] [address] [phone]    Schedule an appointment as soon as possible for a visit in 1 week      [place] [address]  [phone]  Go in 1 day        DISCHARGE MEDICATIONS:  Current Discharge Medication List          Joshua Fish MD  Emergency Medicine Resident    (Please note that portions of thisnote were completed with a voice recognition program.  Efforts were made to edit the dictations but occasionally words are mis-transcribed.)

## 2025-06-08 NOTE — DISCHARGE INSTRUCTIONS
Take your medication as indicated and prescribed.  For pain use acetaminophen (Tylenol) or ibuprofen (Motrin / Advil), unless prescribed medications that have acetaminophen or ibuprofen (or similar medications) in it.  You can take over the counter acetaminophen tablets (1 - 2 tablets of the 500-mg strength every 6 hours) or ibuprofen tablets (2 tablets every 4 hours).    Use an ice pack or bag filled with ice and apply to the injured area 3 - 4 times a day for 15 - 20 minutes each time.  If the injury is older than 3 days, then use a heating pad to help relax the muscles.    PLEASE RETURN TO THE EMERGENCY DEPARTMENT IMMEDIATELY for worsening of pain, worse swelling to your elbow or wrist, inability to move your arm / wrist, or if you develop any concerning symptoms such as: numbness, weakness or tingling in the arms or legs or change in color of the extremities, changes in mental status, persistent headache, blurry vision, unable to follow up with your physician, or any other care or concern.